# Patient Record
Sex: MALE | Race: WHITE | NOT HISPANIC OR LATINO | Employment: FULL TIME | URBAN - NONMETROPOLITAN AREA
[De-identification: names, ages, dates, MRNs, and addresses within clinical notes are randomized per-mention and may not be internally consistent; named-entity substitution may affect disease eponyms.]

---

## 2023-03-17 DIAGNOSIS — K21.9 GASTRO-ESOPHAGEAL REFLUX DISEASE WITHOUT ESOPHAGITIS: ICD-10-CM

## 2023-03-20 RX ORDER — PANTOPRAZOLE SODIUM 40 MG/1
TABLET, DELAYED RELEASE ORAL
Qty: 90 TABLET | Refills: 3 | Status: SHIPPED | OUTPATIENT
Start: 2023-03-20

## 2023-06-20 DIAGNOSIS — Z71.6 ENCOUNTER FOR SMOKING CESSATION COUNSELING: Primary | ICD-10-CM

## 2023-06-20 RX ORDER — IBUPROFEN 200 MG
1 TABLET ORAL EVERY 24 HOURS
Qty: 30 PATCH | Refills: 0 | Status: SHIPPED | OUTPATIENT
Start: 2023-06-20 | End: 2023-08-16

## 2023-08-15 DIAGNOSIS — Z71.6 ENCOUNTER FOR SMOKING CESSATION COUNSELING: ICD-10-CM

## 2023-08-16 RX ORDER — IBUPROFEN 200 MG
1 TABLET ORAL EVERY 24 HOURS
Qty: 28 PATCH | Refills: 1 | Status: SHIPPED | OUTPATIENT
Start: 2023-08-16 | End: 2023-12-21 | Stop reason: WASHOUT

## 2023-12-20 PROBLEM — R29.898 XIPHOID PROMINENCE: Status: ACTIVE | Noted: 2023-12-20

## 2023-12-20 PROBLEM — E78.5 HYPERLIPIDEMIA: Status: ACTIVE | Noted: 2023-12-20

## 2023-12-20 PROBLEM — E11.9 DIABETES MELLITUS, TYPE 2 (MULTI): Status: ACTIVE | Noted: 2023-12-20

## 2023-12-20 PROBLEM — M54.50 LOW BACK PAIN: Status: ACTIVE | Noted: 2023-12-20

## 2023-12-20 PROBLEM — F17.200 NICOTINE DEPENDENCE: Status: ACTIVE | Noted: 2023-12-20

## 2023-12-20 PROBLEM — K21.9 GERD (GASTROESOPHAGEAL REFLUX DISEASE): Status: ACTIVE | Noted: 2023-12-20

## 2023-12-20 PROBLEM — E11.21 DIABETIC NEPHROPATHY (MULTI): Status: ACTIVE | Noted: 2023-12-20

## 2023-12-20 PROBLEM — E78.1 HYPERTRIGLYCERIDEMIA: Status: ACTIVE | Noted: 2023-12-20

## 2023-12-20 PROBLEM — D72.829 LEUKOCYTOSIS: Status: ACTIVE | Noted: 2023-12-20

## 2023-12-20 PROBLEM — I10 HYPERTENSION: Status: ACTIVE | Noted: 2023-12-20

## 2023-12-20 RX ORDER — ACETAMINOPHEN 500 MG
1 TABLET ORAL DAILY
COMMUNITY
Start: 2014-11-26

## 2023-12-20 RX ORDER — LOSARTAN POTASSIUM 100 MG/1
100 TABLET ORAL DAILY
COMMUNITY
End: 2023-12-21 | Stop reason: ALTCHOICE

## 2023-12-20 RX ORDER — HYDROCHLOROTHIAZIDE 12.5 MG/1
12.5 CAPSULE ORAL DAILY
COMMUNITY
Start: 2023-05-30

## 2023-12-20 RX ORDER — CYCLOBENZAPRINE HCL 5 MG
1 TABLET ORAL NIGHTLY
COMMUNITY
Start: 2016-05-26 | End: 2023-12-21 | Stop reason: WASHOUT

## 2023-12-20 RX ORDER — INSULIN GLARGINE 100 [IU]/ML
30 INJECTION, SOLUTION SUBCUTANEOUS
COMMUNITY
Start: 2021-09-16

## 2023-12-20 RX ORDER — INSULIN LISPRO 100 [IU]/ML
25 INJECTION, SOLUTION INTRAVENOUS; SUBCUTANEOUS
COMMUNITY
Start: 2023-07-21 | End: 2023-12-21 | Stop reason: DRUGHIGH

## 2023-12-20 RX ORDER — METFORMIN HYDROCHLORIDE 500 MG/1
TABLET, EXTENDED RELEASE ORAL
COMMUNITY
Start: 2016-05-26 | End: 2023-12-21 | Stop reason: ALTCHOICE

## 2023-12-20 RX ORDER — PEN NEEDLE, DIABETIC 31 GX5/16"
NEEDLE, DISPOSABLE MISCELLANEOUS
COMMUNITY
Start: 2023-03-02

## 2023-12-20 RX ORDER — FENOFIBRATE 200 MG/1
CAPSULE ORAL
COMMUNITY
End: 2023-12-26

## 2023-12-21 ENCOUNTER — OFFICE VISIT (OUTPATIENT)
Dept: PRIMARY CARE | Facility: CLINIC | Age: 63
End: 2023-12-21
Payer: COMMERCIAL

## 2023-12-21 VITALS
HEIGHT: 69 IN | BODY MASS INDEX: 30.2 KG/M2 | OXYGEN SATURATION: 98 % | HEART RATE: 81 BPM | WEIGHT: 203.9 LBS | DIASTOLIC BLOOD PRESSURE: 76 MMHG | SYSTOLIC BLOOD PRESSURE: 160 MMHG | RESPIRATION RATE: 16 BRPM | TEMPERATURE: 97.8 F

## 2023-12-21 DIAGNOSIS — I10 PRIMARY HYPERTENSION: Primary | ICD-10-CM

## 2023-12-21 DIAGNOSIS — Z12.5 SCREENING PSA (PROSTATE SPECIFIC ANTIGEN): ICD-10-CM

## 2023-12-21 DIAGNOSIS — G89.29 CHRONIC BILATERAL LOW BACK PAIN, UNSPECIFIED WHETHER SCIATICA PRESENT: ICD-10-CM

## 2023-12-21 DIAGNOSIS — B35.6 TINEA CRURIS: ICD-10-CM

## 2023-12-21 DIAGNOSIS — M54.50 CHRONIC BILATERAL LOW BACK PAIN, UNSPECIFIED WHETHER SCIATICA PRESENT: ICD-10-CM

## 2023-12-21 DIAGNOSIS — E78.5 HYPERLIPIDEMIA, UNSPECIFIED HYPERLIPIDEMIA TYPE: ICD-10-CM

## 2023-12-21 PROBLEM — E66.9 OBESITY, CLASS I, BMI 30-34.9: Status: ACTIVE | Noted: 2023-07-21

## 2023-12-21 PROBLEM — E66.811 OBESITY, CLASS I, BMI 30-34.9: Status: ACTIVE | Noted: 2023-07-21

## 2023-12-21 PROBLEM — K57.90 DIVERTICULOSIS: Status: ACTIVE | Noted: 2023-01-25

## 2023-12-21 PROCEDURE — 99214 OFFICE O/P EST MOD 30 MIN: CPT | Performed by: FAMILY MEDICINE

## 2023-12-21 PROCEDURE — 3077F SYST BP >= 140 MM HG: CPT | Performed by: FAMILY MEDICINE

## 2023-12-21 PROCEDURE — 4010F ACE/ARB THERAPY RXD/TAKEN: CPT | Performed by: FAMILY MEDICINE

## 2023-12-21 PROCEDURE — 3078F DIAST BP <80 MM HG: CPT | Performed by: FAMILY MEDICINE

## 2023-12-21 RX ORDER — EZETIMIBE 10 MG/1
10 TABLET ORAL DAILY
COMMUNITY
Start: 2023-11-19

## 2023-12-21 RX ORDER — VALSARTAN 320 MG/1
320 TABLET ORAL DAILY
Qty: 90 TABLET | Refills: 3 | Status: SHIPPED | OUTPATIENT
Start: 2023-12-21 | End: 2024-01-19 | Stop reason: SINTOL

## 2023-12-21 RX ORDER — CYCLOBENZAPRINE HCL 5 MG
5 TABLET ORAL 3 TIMES DAILY PRN
Qty: 30 TABLET | Refills: 1 | Status: SHIPPED | OUTPATIENT
Start: 2023-12-21 | End: 2024-02-19

## 2023-12-21 RX ORDER — INSULIN LISPRO 100 [IU]/ML
INJECTION, SOLUTION INTRAVENOUS; SUBCUTANEOUS
Qty: 15 ML | Refills: 0 | Status: SHIPPED | OUTPATIENT
Start: 2023-12-21

## 2023-12-21 RX ORDER — LATANOPROST 50 UG/ML
SOLUTION/ DROPS OPHTHALMIC
COMMUNITY
Start: 2023-11-29

## 2023-12-21 RX ORDER — MOMETASONE FUROATE 1 MG/G
CREAM TOPICAL
Qty: 45 G | Refills: 0 | Status: SHIPPED | OUTPATIENT
Start: 2023-12-21 | End: 2024-02-19

## 2023-12-21 ASSESSMENT — PATIENT HEALTH QUESTIONNAIRE - PHQ9
1. LITTLE INTEREST OR PLEASURE IN DOING THINGS: NOT AT ALL
2. FEELING DOWN, DEPRESSED OR HOPELESS: NOT AT ALL
SUM OF ALL RESPONSES TO PHQ9 QUESTIONS 1 AND 2: 0

## 2023-12-21 ASSESSMENT — PAIN SCALES - GENERAL: PAINLEVEL: 0-NO PAIN

## 2023-12-21 NOTE — PROGRESS NOTES
"Subjective   Patient ID: Kenn Paul is a 63 y.o. male who presents for Hyperlipidemia, Hypertension, and Diabetes.    HPI   Vivek is seen today for a routine follow-up of his cholesterol, blood pressure, medication review.  His diabetes is managed by endocrinology, still remains somewhat poorly controlled.  A1c levels have been above 8.  Medication list reconciled, including insulin doses.  He had right shoulder surgery earlier this year, has recovered as well as could be expected, is doing great from a range of motion standpoint, continues to work on strengthening it.  Neuropathy is stable, not sufficiently bothersome to warrant oncologic treatment.  He just had right eye surgery, thus ophthalmology care is up-to-date.    He continues to smoke 1 ppd, had previously quit with Chantix, has had difficulty getting nicotine patches to adhere to his skin.  Blood pressures have been elevated the last couple of checks, he has not checked blood pressures outside of the office.  Review of Systems  The full, 10+ multi-organ review of systems, is within normal limits with the exception of what is noted above in HPI.  Objective   /76 (BP Location: Left arm, Patient Position: Sitting, BP Cuff Size: Adult)   Pulse 81   Temp 36.6 °C (97.8 °F) (Temporal)   Resp 16   Ht 1.753 m (5' 9\")   Wt 92.5 kg (203 lb 14.4 oz)   SpO2 98%   BMI 30.11 kg/m²     Physical Exam  Constitutional/General appearance: alert, oriented, well-appearing, in no distress  Head and face exam is normal  No scleral icterus or conjunctival erythema present  Hearing is grossly normal  Respiratory effort is normal, no dyspnea noted  Cortical function is normal  Mood, affect, are pleasant, appropriate, and interactive.  Insight is normal  Cardiac exam reveals a regular rate and rhythm,  murmur present.   Lungs are clear bilaterally.    No lower extremity edema present.    Assessment/Plan     Diabetes, still poorly controlled, needs to improve " lifestyle efforts, defer insulin management to endocrinology.    Hypertension, need better control, continue hydrochlorothiazide, discontinue losartan in favor of full dose valsartan.  Goal of 130/80 discussed.  Please update me with some blood pressures in a month or so.    Hyperlipidemia, continue current medications, labs ordered.  Continue other medications, follow-up in 6 months

## 2023-12-23 DIAGNOSIS — E78.5 HYPERLIPIDEMIA, UNSPECIFIED: ICD-10-CM

## 2023-12-26 RX ORDER — FENOFIBRATE 200 MG/1
CAPSULE ORAL
Qty: 90 CAPSULE | Refills: 3 | Status: SHIPPED | OUTPATIENT
Start: 2023-12-26

## 2024-01-19 ENCOUNTER — TELEPHONE (OUTPATIENT)
Dept: PRIMARY CARE | Facility: CLINIC | Age: 64
End: 2024-01-19
Payer: COMMERCIAL

## 2024-01-19 DIAGNOSIS — I10 PRIMARY HYPERTENSION: Primary | ICD-10-CM

## 2024-01-19 RX ORDER — VALSARTAN 160 MG/1
TABLET ORAL
Qty: 60 TABLET | Refills: 11 | Status: SHIPPED | OUTPATIENT
Start: 2024-01-19

## 2024-01-19 NOTE — TELEPHONE ENCOUNTER
Pt calling said that the Valsartan 320 mg he can not take they are getting stuck in his throat he has also tried cutting in half and still can not take. Please advise?

## 2024-02-17 DIAGNOSIS — B35.6 TINEA CRURIS: ICD-10-CM

## 2024-02-19 RX ORDER — MOMETASONE FUROATE 1 MG/G
CREAM TOPICAL
Qty: 45 G | Refills: 0 | Status: SHIPPED | OUTPATIENT
Start: 2024-02-19

## 2024-06-18 ASSESSMENT — ENCOUNTER SYMPTOMS
SHORTNESS OF BREATH: 0
PALPITATIONS: 0
HEADACHES: 0
NECK PAIN: 0
ORTHOPNEA: 0
BLURRED VISION: 0
SWEATS: 0
PND: 0
HYPERTENSION: 1

## 2024-06-21 ENCOUNTER — APPOINTMENT (OUTPATIENT)
Dept: PRIMARY CARE | Facility: CLINIC | Age: 64
End: 2024-06-21
Payer: COMMERCIAL

## 2024-06-21 VITALS
DIASTOLIC BLOOD PRESSURE: 69 MMHG | OXYGEN SATURATION: 95 % | SYSTOLIC BLOOD PRESSURE: 129 MMHG | BODY MASS INDEX: 29.12 KG/M2 | WEIGHT: 197.2 LBS | TEMPERATURE: 98 F | HEART RATE: 96 BPM

## 2024-06-21 DIAGNOSIS — G89.29 CHRONIC BILATERAL LOW BACK PAIN, UNSPECIFIED WHETHER SCIATICA PRESENT: Primary | ICD-10-CM

## 2024-06-21 DIAGNOSIS — E11.42 TYPE 2 DIABETES MELLITUS WITH DIABETIC POLYNEUROPATHY, WITH LONG-TERM CURRENT USE OF INSULIN (MULTI): ICD-10-CM

## 2024-06-21 DIAGNOSIS — E78.5 HYPERLIPIDEMIA, UNSPECIFIED HYPERLIPIDEMIA TYPE: ICD-10-CM

## 2024-06-21 DIAGNOSIS — Z79.4 TYPE 2 DIABETES MELLITUS WITH DIABETIC POLYNEUROPATHY, WITH LONG-TERM CURRENT USE OF INSULIN (MULTI): ICD-10-CM

## 2024-06-21 DIAGNOSIS — Z12.5 SCREENING PSA (PROSTATE SPECIFIC ANTIGEN): ICD-10-CM

## 2024-06-21 DIAGNOSIS — Z23 IMMUNIZATION DUE: ICD-10-CM

## 2024-06-21 DIAGNOSIS — M54.50 CHRONIC BILATERAL LOW BACK PAIN, UNSPECIFIED WHETHER SCIATICA PRESENT: Primary | ICD-10-CM

## 2024-06-21 DIAGNOSIS — I10 PRIMARY HYPERTENSION: ICD-10-CM

## 2024-06-21 PROCEDURE — 4010F ACE/ARB THERAPY RXD/TAKEN: CPT | Performed by: FAMILY MEDICINE

## 2024-06-21 PROCEDURE — 3078F DIAST BP <80 MM HG: CPT | Performed by: FAMILY MEDICINE

## 2024-06-21 PROCEDURE — 99214 OFFICE O/P EST MOD 30 MIN: CPT | Performed by: FAMILY MEDICINE

## 2024-06-21 PROCEDURE — 3074F SYST BP LT 130 MM HG: CPT | Performed by: FAMILY MEDICINE

## 2024-06-21 PROCEDURE — 90715 TDAP VACCINE 7 YRS/> IM: CPT | Performed by: FAMILY MEDICINE

## 2024-06-21 PROCEDURE — 90471 IMMUNIZATION ADMIN: CPT | Performed by: FAMILY MEDICINE

## 2024-06-21 RX ORDER — CYCLOBENZAPRINE HCL 5 MG
1 TABLET ORAL 3 TIMES DAILY PRN
COMMUNITY
Start: 2023-12-21 | End: 2024-06-21 | Stop reason: SDUPTHER

## 2024-06-21 RX ORDER — CYCLOBENZAPRINE HCL 5 MG
5 TABLET ORAL NIGHTLY
Qty: 40 TABLET | Refills: 0 | Status: SHIPPED | OUTPATIENT
Start: 2024-06-21

## 2024-06-21 RX ORDER — INSULIN GLARGINE 100 [IU]/ML
INJECTION, SOLUTION SUBCUTANEOUS
Qty: 3 ML | Refills: 0 | Status: SHIPPED | OUTPATIENT
Start: 2024-06-21

## 2024-06-21 RX ORDER — INSULIN LISPRO 100 [IU]/ML
INJECTION, SOLUTION INTRAVENOUS; SUBCUTANEOUS
Qty: 15 ML | Refills: 0 | Status: SHIPPED | OUTPATIENT
Start: 2024-06-21

## 2024-06-21 NOTE — PROGRESS NOTES
Subjective   Patient ID: Kenn Paul is a 63 y.o. male who presents for Follow-up (Pt is here to follow up on DM/HTN/Chol,. Pt states he has started having ringing in his ears since he started taking insulin. He states that some days are worse then others. He states it is in both ears. ).    HPI   Vivek was seen today for a routine follow-up of his medications, including those for hypertension, hyperlipidemia, diabetes.  The latter is managed by endocrinology, Dr. Shah.  He has an appointment in August with him,   Has not had a recent A1c.  Medication(s) are being taken and tolerated as prescribed, without concerns, list reconciled today.  There are no complaints of chest pain, shortness of breath, lower extremity edema, or exertional concerns  Vivek feels hypoglycemic below 100, occurs perhaps twice/month,   Usually associated with increased activity or more often skipping meals.  He only uses his Humalog with meals, generally twice daily.  Review of Systems  The full review of systems is negative with the exception of what is noted in HPI    Objective   /69 (BP Location: Right arm, Patient Position: Sitting, BP Cuff Size: Adult)   Pulse 96   Temp 36.7 °C (98 °F) (Temporal)   Wt 89.4 kg (197 lb 3.2 oz)   SpO2 95%   BMI 29.12 kg/m²     Physical Exam  Constitutional/General appearance: alert, oriented, well-appearing, in no distress  Head and face exam is normal  No scleral icterus or conjunctival erythema present  Hearing is grossly normal  Respiratory effort is normal, no dyspnea noted  Cortical function is normal  Mood, affect, are pleasant, appropriate, and interactive.  Insight is normal    Assessment/Plan     Hypertension--- since today's blood pressures are at goal, I have recommended continuing the current treatment regimen, including medication as noted above, as well as a low salt, low-fat, high-fiber diet.  Exercise is to be continued as able and tolerated.  We will continue to follow  the high blood pressure on an every six-month basis, and address additional needs should they arise.    Hyperlipidemia, not on statin therapy, remotely there was a question of whether or not it caused pancreatitis.    Continue diabetes management, per endocrinology.    Order for labs provided, including a PSA.  Boostrix updated    Follow-up in 6 months  **Portions of this medical record have been created using voice recognition software and may have minor errors which are inherent in voice recognition systems. It has not been fully edited for typographical or grammatical errors**

## 2024-08-05 LAB — HEMOGLOBIN A1C/HEMOGLOBIN TOTAL IN BLOOD EXTERNAL: 10 %

## 2024-08-20 DIAGNOSIS — B35.6 TINEA CRURIS: ICD-10-CM

## 2024-08-20 RX ORDER — MOMETASONE FUROATE 1 MG/G
CREAM TOPICAL
Qty: 45 G | Refills: 0 | Status: SHIPPED | OUTPATIENT
Start: 2024-08-20

## 2024-10-06 ENCOUNTER — TELEPHONE (OUTPATIENT)
Dept: PRIMARY CARE | Facility: CLINIC | Age: 64
End: 2024-10-06

## 2024-10-06 DIAGNOSIS — B35.6 TINEA CRURIS: ICD-10-CM

## 2024-10-07 NOTE — TELEPHONE ENCOUNTER
Received refill request for mometasone cream.  Please check to see if he is still using it in his groin.  If so, he really should not use it any longer, as it can thin the skin over time.  If symptoms persist, would need to try a nonsteroid cream

## 2024-10-10 RX ORDER — MOMETASONE FUROATE 1 MG/G
CREAM TOPICAL
Qty: 45 G | Refills: 0 | OUTPATIENT
Start: 2024-10-10

## 2024-10-10 NOTE — TELEPHONE ENCOUNTER
Patient states he does not need any more of this cream, he has several tubes at home and uses very sparingly

## 2025-01-06 ENCOUNTER — INPATIENT HOSPITAL (OUTPATIENT)
Dept: URBAN - METROPOLITAN AREA HOSPITAL 50 | Facility: HOSPITAL | Age: 65
End: 2025-01-06

## 2025-01-06 DIAGNOSIS — K59.09 OTHER CONSTIPATION: ICD-10-CM

## 2025-01-06 DIAGNOSIS — K86.1 OTHER CHRONIC PANCREATITIS: ICD-10-CM

## 2025-01-06 PROCEDURE — 99222 1ST HOSP IP/OBS MODERATE 55: CPT | Performed by: INTERNAL MEDICINE

## 2025-01-07 ENCOUNTER — INPATIENT HOSPITAL (OUTPATIENT)
Dept: URBAN - METROPOLITAN AREA HOSPITAL 50 | Facility: HOSPITAL | Age: 65
End: 2025-01-07

## 2025-01-07 DIAGNOSIS — K59.09 OTHER CONSTIPATION: ICD-10-CM

## 2025-01-07 DIAGNOSIS — K86.1 OTHER CHRONIC PANCREATITIS: ICD-10-CM

## 2025-01-07 PROCEDURE — 99233 SBSQ HOSP IP/OBS HIGH 50: CPT | Performed by: CLINICAL NURSE SPECIALIST

## 2025-01-10 ENCOUNTER — PATIENT OUTREACH (OUTPATIENT)
Dept: PRIMARY CARE | Facility: CLINIC | Age: 65
End: 2025-01-10
Payer: COMMERCIAL

## 2025-01-10 NOTE — PROGRESS NOTES
Discharge Facility: [facility]  Discharge Diagnosis: pancreatitis  Admission Date: 1/5/25  Discharge Date: 1/8/25    PCP Appointment Date: 1/24/25  Specialist Appointment Date: N/A  Hospital Encounter and Summary Linked: Yes  Two attempts were made to reach patient within two business days after discharge. Voicemail left with contact information for patient to call back with any non-emergent questions or concerns.

## 2025-01-12 DIAGNOSIS — E78.5 HYPERLIPIDEMIA, UNSPECIFIED: ICD-10-CM

## 2025-01-14 RX ORDER — FENOFIBRATE 200 MG/1
CAPSULE ORAL
Qty: 90 CAPSULE | Refills: 1 | Status: SHIPPED | OUTPATIENT
Start: 2025-01-14

## 2025-01-24 ENCOUNTER — APPOINTMENT (OUTPATIENT)
Dept: PRIMARY CARE | Facility: CLINIC | Age: 65
End: 2025-01-24
Payer: COMMERCIAL

## 2025-01-24 ENCOUNTER — PATIENT OUTREACH (OUTPATIENT)
Dept: PRIMARY CARE | Facility: CLINIC | Age: 65
End: 2025-01-24

## 2025-01-30 ENCOUNTER — OFFICE (OUTPATIENT)
Dept: URBAN - METROPOLITAN AREA CLINIC 26 | Facility: CLINIC | Age: 65
End: 2025-01-30
Payer: COMMERCIAL

## 2025-01-30 VITALS
HEIGHT: 69 IN | TEMPERATURE: 97.9 F | HEART RATE: 93 BPM | SYSTOLIC BLOOD PRESSURE: 154 MMHG | DIASTOLIC BLOOD PRESSURE: 89 MMHG | WEIGHT: 180 LBS | SYSTOLIC BLOOD PRESSURE: 136 MMHG

## 2025-01-30 DIAGNOSIS — R10.9 UNSPECIFIED ABDOMINAL PAIN: ICD-10-CM

## 2025-01-30 DIAGNOSIS — K85.90 ACUTE PANCREATITIS WITHOUT NECROSIS OR INFECTION, UNSPECIFIE: ICD-10-CM

## 2025-01-30 DIAGNOSIS — K59.00 CONSTIPATION, UNSPECIFIED: ICD-10-CM

## 2025-01-30 DIAGNOSIS — K22.70 BARRETT'S ESOPHAGUS WITHOUT DYSPLASIA: ICD-10-CM

## 2025-01-30 DIAGNOSIS — Z86.0100 PERSONAL HISTORY OF COLON POLYPS, UNSPECIFIED: ICD-10-CM

## 2025-01-30 DIAGNOSIS — K21.9 GASTRO-ESOPHAGEAL REFLUX DISEASE WITHOUT ESOPHAGITIS: ICD-10-CM

## 2025-01-30 PROCEDURE — 99214 OFFICE O/P EST MOD 30 MIN: CPT | Performed by: NURSE PRACTITIONER

## 2025-01-30 RX ORDER — PANCRELIPASE 120000; 24000; 76000 [USP'U]/1; [USP'U]/1; [USP'U]/1
CAPSULE, DELAYED RELEASE PELLETS ORAL
Qty: 240 | Refills: 2 | Status: ACTIVE
Start: 2025-01-30

## 2025-02-03 ENCOUNTER — DOCUMENTATION (OUTPATIENT)
Dept: GASTROENTEROLOGY | Facility: HOSPITAL | Age: 65
End: 2025-02-03
Payer: COMMERCIAL

## 2025-02-03 NOTE — PROGRESS NOTES
Dr. Ruben Stovall's office received a referral for this patient from [facility] requesting an EGD/EUS for recurrent pancreatitis and history of Bose's Esophagus. Dr. Juan Guzman reviewed the referral on 2/2/2024 and approved proceeding with scheduling for an EUS.    Cassi Arango was notified to call and schedule this patient. Contact Gisel Pham RN at [phone] for any questions.      Below is supporting clinical information form the referral sent by [facility]'s office and patient's medical records. Referral documents from the referring office were scanned under Media, dated 1/30/2025.     Requesting EGD/EUS      Notes: EGD/EUS - recurrent pancreatitis/ Hx BE     Labs 1/8/2025:  ·       Bilirubin, Total: 0.4  ·       Alkaline Phosphatase: 60  ·       ALT: 9  ·       AST: 14  Lipase 63 on 1/7/2025     Most recent EGD (2013) Brandin Valadez MD:  ·       Mild duodenitis  ·       Mild bile reflux gastritis  ·       Irregular GEJ  ·       Esophageal stricture s/p 60 Fr Olmedo dilation  ·       (+) BE on biopsies     Christofer Wiggins MD - 01/07/2025 12:19 PM EST  Formatting of this note is different from the original.  GASTROENTEROLOGY CONSULT PROGRESS NOTE     Patient Name: Kenn Paul  MRN: [id number]  SERVICE DATE: January 7, 2025  SERVICE TIME: 12:19 PM     ASSESSMENT  Recurrent, acute pancreatitis (4th episode)  Bose's esophagus / Hx colon polyps  Diabetes  Chronic aspirin use  Tobacco use     PLAN  - Trial low fat, dental soft diet  - Await results of IGG4  - Recommend EGD/EUS as outpatient in ~ 4 weeks  - Smoking cessation advised  - Protonix 40 mg daily  - Monitor CBC, CMP, lipase  - Also recommend colonoscopy once acute issues have resolved     Patient seen and examined. Discussed with mid level provider. Key findings confirmed. Plan as outlined.     Christofer Wiggins MD  January 7, 2025  1:08 PM     Liver Function, Amylase, & Lipase   Recent Labs   01/07/25  5692  01/06/25  0445 01/05/25  1308   TPROT 5.9* 6.0* 7.3   ALB 3.4* 3.6* 4.2   ALT 8* 9* 11   AST 12* 11* 14   ALKPHOS 59 56 73   TBILI 0.4 0.4 0.3   LIPASE 63* 66* 117*         1/5/25 CT A/P  Liver:   * There is a 7 mm low-density lesion in the dome of the liver axial   image 2/18. This appear to be present previously but not as well seen.   Therefore is felt to be benign   Homogeneous texture.   Biliary: No ductal dilatation is seen.   GI tract: No bowel dilatation is seen. No evidence of obstruction.   Spleen: Spleen is unremarkable.   Pancreas:   * There is minimal indistinctness surrounding the head of the pancreas   images 2/30-33   Adrenals: Adrenal glands are unremarkable.   Kidneys:   RIGHT kidney:   No masses   No calcifications are seen.   No hydronephrosis is seen.   LEFT kidney:   No masses   No calcifications are seen.   No hydronephrosis is seen.   Lymph nodes: No evidence of adenopathy.   Mesentery/Peritoneum: No ascites or mass.   Vasculature: No evidence of dilatation of the abdominal aorta.   Bony structures: No fractures or dislocations are seen.   Soft tissues: No acute findings.   CT PELVIS FINDINGS:   Lymph Nodes: No enlarged lymph nodes.   Urinary bladder: Unremarkable   IMPRESSION:  Mild indistinctness around the head of the pancreas. Could represent  mild pancreatitis      1/6/25 RUQ US  IMPRESSION:  Hepatic steatosis.  Nonvisualization of the pancreas  Normal gallbladder  CBD 3 mm

## 2025-02-04 ENCOUNTER — INPATIENT HOSPITAL (OUTPATIENT)
Dept: URBAN - METROPOLITAN AREA HOSPITAL 50 | Facility: HOSPITAL | Age: 65
End: 2025-02-04

## 2025-02-04 DIAGNOSIS — K86.1 OTHER CHRONIC PANCREATITIS: ICD-10-CM

## 2025-02-04 DIAGNOSIS — K31.89 OTHER DISEASES OF STOMACH AND DUODENUM: ICD-10-CM

## 2025-02-04 DIAGNOSIS — K44.9 DIAPHRAGMATIC HERNIA WITHOUT OBSTRUCTION OR GANGRENE: ICD-10-CM

## 2025-02-04 DIAGNOSIS — K22.70 BARRETT'S ESOPHAGUS WITHOUT DYSPLASIA: ICD-10-CM

## 2025-02-04 DIAGNOSIS — K59.09 OTHER CONSTIPATION: ICD-10-CM

## 2025-02-04 PROCEDURE — 99222 1ST HOSP IP/OBS MODERATE 55: CPT | Performed by: INTERNAL MEDICINE

## 2025-02-05 PROCEDURE — 43239 EGD BIOPSY SINGLE/MULTIPLE: CPT | Performed by: INTERNAL MEDICINE

## 2025-02-07 ENCOUNTER — INPATIENT HOSPITAL (OUTPATIENT)
Dept: URBAN - METROPOLITAN AREA HOSPITAL 50 | Facility: HOSPITAL | Age: 65
End: 2025-02-07

## 2025-02-07 DIAGNOSIS — K44.9 DIAPHRAGMATIC HERNIA WITHOUT OBSTRUCTION OR GANGRENE: ICD-10-CM

## 2025-02-07 DIAGNOSIS — K59.09 OTHER CONSTIPATION: ICD-10-CM

## 2025-02-07 DIAGNOSIS — K83.8 OTHER SPECIFIED DISEASES OF BILIARY TRACT: ICD-10-CM

## 2025-02-07 DIAGNOSIS — K86.1 OTHER CHRONIC PANCREATITIS: ICD-10-CM

## 2025-02-07 PROCEDURE — 99233 SBSQ HOSP IP/OBS HIGH 50: CPT | Performed by: NURSE PRACTITIONER

## 2025-02-19 ENCOUNTER — OFFICE (OUTPATIENT)
Dept: URBAN - METROPOLITAN AREA CLINIC 26 | Facility: CLINIC | Age: 65
End: 2025-02-19

## 2025-02-19 VITALS
TEMPERATURE: 97.1 F | SYSTOLIC BLOOD PRESSURE: 140 MMHG | WEIGHT: 180 LBS | DIASTOLIC BLOOD PRESSURE: 87 MMHG | HEART RATE: 92 BPM | HEIGHT: 69 IN

## 2025-02-19 DIAGNOSIS — K82.8 OTHER SPECIFIED DISEASES OF GALLBLADDER: ICD-10-CM

## 2025-02-19 DIAGNOSIS — K22.70 BARRETT'S ESOPHAGUS WITHOUT DYSPLASIA: ICD-10-CM

## 2025-02-19 DIAGNOSIS — K59.00 CONSTIPATION, UNSPECIFIED: ICD-10-CM

## 2025-02-19 DIAGNOSIS — K85.90 ACUTE PANCREATITIS WITHOUT NECROSIS OR INFECTION, UNSPECIFIE: ICD-10-CM

## 2025-02-19 PROCEDURE — 99213 OFFICE O/P EST LOW 20 MIN: CPT | Performed by: NURSE PRACTITIONER

## 2025-03-27 ENCOUNTER — ANESTHESIA (OUTPATIENT)
Dept: GASTROENTEROLOGY | Facility: HOSPITAL | Age: 65
End: 2025-03-27
Payer: COMMERCIAL

## 2025-03-27 ENCOUNTER — ANESTHESIA EVENT (OUTPATIENT)
Dept: GASTROENTEROLOGY | Facility: HOSPITAL | Age: 65
End: 2025-03-27
Payer: COMMERCIAL

## 2025-03-27 ENCOUNTER — HOSPITAL ENCOUNTER (OUTPATIENT)
Dept: GASTROENTEROLOGY | Facility: HOSPITAL | Age: 65
Discharge: HOME | End: 2025-03-27
Payer: COMMERCIAL

## 2025-03-27 VITALS
BODY MASS INDEX: 26.54 KG/M2 | RESPIRATION RATE: 14 BRPM | HEIGHT: 70 IN | HEART RATE: 81 BPM | OXYGEN SATURATION: 96 % | TEMPERATURE: 96.8 F | DIASTOLIC BLOOD PRESSURE: 79 MMHG | SYSTOLIC BLOOD PRESSURE: 125 MMHG | WEIGHT: 185.41 LBS

## 2025-03-27 DIAGNOSIS — Z87.19 HISTORY OF BARRETT ESOPHAGUS: ICD-10-CM

## 2025-03-27 DIAGNOSIS — K85.90 ACUTE RECURRENT PANCREATITIS (HHS-HCC): ICD-10-CM

## 2025-03-27 PROBLEM — N18.9 CHRONIC RENAL INSUFFICIENCY: Status: ACTIVE | Noted: 2025-03-27

## 2025-03-27 LAB
GLUCOSE BLD MANUAL STRIP-MCNC: 101 MG/DL (ref 74–99)
GLUCOSE BLD MANUAL STRIP-MCNC: 82 MG/DL (ref 74–99)

## 2025-03-27 PROCEDURE — 2500000004 HC RX 250 GENERAL PHARMACY W/ HCPCS (ALT 636 FOR OP/ED): Performed by: ANESTHESIOLOGIST ASSISTANT

## 2025-03-27 PROCEDURE — 43237 ENDOSCOPIC US EXAM ESOPH: CPT | Performed by: INTERNAL MEDICINE

## 2025-03-27 PROCEDURE — 7100000010 HC PHASE TWO TIME - EACH INCREMENTAL 1 MINUTE

## 2025-03-27 PROCEDURE — 7100000009 HC PHASE TWO TIME - INITIAL BASE CHARGE

## 2025-03-27 PROCEDURE — A43237 PR ESOPHAGOGASTRODUODENOSCOPY US SCOPE W/ADJ STRXRS: Performed by: ANESTHESIOLOGIST ASSISTANT

## 2025-03-27 PROCEDURE — A43237 PR ESOPHAGOGASTRODUODENOSCOPY US SCOPE W/ADJ STRXRS: Performed by: ANESTHESIOLOGY

## 2025-03-27 PROCEDURE — 3700000002 HC GENERAL ANESTHESIA TIME - EACH INCREMENTAL 1 MINUTE

## 2025-03-27 PROCEDURE — 3700000001 HC GENERAL ANESTHESIA TIME - INITIAL BASE CHARGE

## 2025-03-27 PROCEDURE — 82947 ASSAY GLUCOSE BLOOD QUANT: CPT

## 2025-03-27 RX ORDER — PROPOFOL 10 MG/ML
INJECTION, EMULSION INTRAVENOUS CONTINUOUS PRN
Status: DISCONTINUED | OUTPATIENT
Start: 2025-03-27 | End: 2025-03-27

## 2025-03-27 RX ORDER — FENTANYL CITRATE 50 UG/ML
INJECTION, SOLUTION INTRAMUSCULAR; INTRAVENOUS AS NEEDED
Status: DISCONTINUED | OUTPATIENT
Start: 2025-03-27 | End: 2025-03-27

## 2025-03-27 RX ORDER — MIDAZOLAM HYDROCHLORIDE 1 MG/ML
INJECTION INTRAMUSCULAR; INTRAVENOUS AS NEEDED
Status: DISCONTINUED | OUTPATIENT
Start: 2025-03-27 | End: 2025-03-27

## 2025-03-27 RX ADMIN — PROPOFOL 300 MCG/KG/MIN: 10 INJECTION, EMULSION INTRAVENOUS at 13:38

## 2025-03-27 RX ADMIN — FENTANYL CITRATE 50 MCG: 50 INJECTION, SOLUTION INTRAMUSCULAR; INTRAVENOUS at 13:45

## 2025-03-27 RX ADMIN — PROPOFOL 50 MG: 10 INJECTION, EMULSION INTRAVENOUS at 13:39

## 2025-03-27 RX ADMIN — MIDAZOLAM HYDROCHLORIDE 2 MG: 1 INJECTION, SOLUTION INTRAMUSCULAR; INTRAVENOUS at 13:27

## 2025-03-27 RX ADMIN — FENTANYL CITRATE 50 MCG: 50 INJECTION, SOLUTION INTRAMUSCULAR; INTRAVENOUS at 13:38

## 2025-03-27 ASSESSMENT — PAIN - FUNCTIONAL ASSESSMENT
PAIN_FUNCTIONAL_ASSESSMENT: 0-10
PAIN_FUNCTIONAL_ASSESSMENT: UNABLE TO SELF-REPORT

## 2025-03-27 ASSESSMENT — ENCOUNTER SYMPTOMS: CONSTITUTIONAL NEGATIVE: 1

## 2025-03-27 ASSESSMENT — PAIN SCALES - GENERAL
PAIN_LEVEL: 0
PAINLEVEL_OUTOF10: 0 - NO PAIN

## 2025-03-27 ASSESSMENT — COLUMBIA-SUICIDE SEVERITY RATING SCALE - C-SSRS
1. IN THE PAST MONTH, HAVE YOU WISHED YOU WERE DEAD OR WISHED YOU COULD GO TO SLEEP AND NOT WAKE UP?: NO
6. HAVE YOU EVER DONE ANYTHING, STARTED TO DO ANYTHING, OR PREPARED TO DO ANYTHING TO END YOUR LIFE?: NO
2. HAVE YOU ACTUALLY HAD ANY THOUGHTS OF KILLING YOURSELF?: NO

## 2025-03-27 NOTE — ANESTHESIA POSTPROCEDURE EVALUATION
Patient: Kenn Paul    Procedure Summary       Date: 03/27/25 Room / Location: [facility]    Anesthesia Start: 1324 Anesthesia Stop: 1400    Procedure: ENDOSCOPIC ULTRASOUND (UPPER) Diagnosis:       Acute recurrent pancreatitis ([facility])      History of Bose esophagus    Scheduled Providers: Ruben Stovall DO; Mello Kamara MD; Shyam Sagastume, RN; Kanika Vail RN Responsible Provider: Mello Kamara MD    Anesthesia Type: MAC ASA Status: 3            Anesthesia Type: MAC    Vitals Value Taken Time   /79 03/27/25 1442   Temp 36 °C (96.8 °F) 03/27/25 1356   Pulse 75 03/27/25 1441   Resp 14 03/27/25 1441   SpO2 96 % 03/27/25 1441   Vitals shown include unfiled device data.    Anesthesia Post Evaluation    Patient location during evaluation: bedside  Patient participation: complete - patient cannot participate  Level of consciousness: awake  Pain score: 0  Pain management: adequate  Airway patency: patent  Cardiovascular status: acceptable and hemodynamically stable  Respiratory status: acceptable and nonlabored ventilation  Hydration status: acceptable  Postoperative Nausea and Vomiting: none        No notable events documented.

## 2025-03-27 NOTE — H&P
History Of Present Illness  Kenn Paul is a 64 y.o. male presenting with recurrent pancreatitis referred for EUS by Dr. Valadez.  He had a recent cholecystectomy at [facility] and feels better overall.  He has a recent diagnosis of BE without dysplasia on EGD in February.     Past Medical History  Past Medical History:   Diagnosis Date    Abdominal distension (gaseous) 01/09/2020    Abdominal bloating    Diabetes mellitus (Multi) 2004    Lateral epicondylitis, unspecified elbow 05/01/2014    Lateral epicondylitis    Left lower quadrant pain 06/05/2019    Acute left lower quadrant pain    Other conditions influencing health status 03/07/2014    Uncontrolled diabetes mellitus    Other disorders of prepuce 01/27/2015    Excessive foreskin    Other disorders of prepuce 01/27/2015    Foreskin does not retract    Other insect allergy status 09/27/2019    Allergic reaction to insect bite    Pain in unspecified elbow 05/20/2014    Joint pain, elbow    Personal history of other diseases of the digestive system 01/11/2020    History of acute pancreatitis    Personal history of other diseases of the digestive system 01/09/2020    History of constipation    Personal history of other diseases of the digestive system 06/13/2014    History of pancreatitis    Personal history of other diseases of the musculoskeletal system and connective tissue 11/15/2016    History of muscle pain    Personal history of other diseases of the respiratory system 12/01/2016    History of acute sinusitis    Personal history of other diseases of the respiratory system 02/05/2015    History of acute bronchitis    Personal history of other diseases of the respiratory system 02/05/2015    History of acute sinusitis    Personal history of other infectious and parasitic diseases 11/26/2014    History of tinea cruris    Personal history of other specified conditions 01/09/2020    History of abdominal pain     Surgical History  Past Surgical History:  "  Procedure Laterality Date    COLONOSCOPY  02/17/2014    Complete Colonoscopy    ESOPHAGOGASTRODUODENOSCOPY  02/17/2014    Diagnostic Esophagogastroduodenoscopy    EYE SURGERY  11/1/23    HERNIA REPAIR  02/17/2014    Hernia Repair     Social History  He reports that he has been smoking cigarettes. He has a 15 pack-year smoking history. He has never used smokeless tobacco. He reports that he does not currently use alcohol. He reports that he does not use drugs.    Family History  Family History   Problem Relation Name Age of Onset    Alzheimer's disease Mother          Allergies  Allergies   Allergen Reactions    Pollen Extracts Other     Watery eyes sneezing    Meperidine Nausea/vomiting    Rosuvastatin Other     PANCREATITIS    Simvastatin Other     PANCREATITIS      Review of Systems   Constitutional: Negative.         Physical Exam  Constitutional:       Appearance: Normal appearance.   Cardiovascular:      Rate and Rhythm: Normal rate and regular rhythm.   Pulmonary:      Effort: Pulmonary effort is normal.      Breath sounds: Normal breath sounds.   Abdominal:      Palpations: Abdomen is soft.   Neurological:      Mental Status: He is alert.   Psychiatric:         Mood and Affect: Mood normal.         Behavior: Behavior normal.         Thought Content: Thought content normal.         Judgment: Judgment normal.          Last Recorded Vitals  Blood pressure 162/87, pulse 95, temperature 36.3 °C (97.3 °F), temperature source Temporal, resp. rate 22, height 1.778 m (5' 10\"), weight 84.1 kg (185 lb 6.5 oz), SpO2 97%.    Assessment/Plan   EUS as planned     Ruben Stovall, DO  "

## 2025-03-27 NOTE — ANESTHESIA PREPROCEDURE EVALUATION
Patient: Kenn Paul    Procedure Information       Date/Time: 03/27/25 1330    Scheduled providers: Ruben Stovall DO; Mello Kamara MD    Procedure: ENDOSCOPIC ULTRASOUND (UPPER)    Location: [facility]            Relevant Problems   Anesthesia (within normal limits)      Cardiac   (+) Hyperlipidemia   (+) Hypertension   (+) Hypertriglyceridemia      Pulmonary  Smoker 3/4ppd      Neuro (within normal limits)      GI  Barretts esoph   (+) GERD (gastroesophageal reflux disease)      /Renal   (+) Chronic renal insufficiency      Liver  Heavy EtOH use quit 15y ago   (+) Fatty liver   (+) Pancreatitis ([facility]) (Recurrent pancreatitis)      Endocrine   (+) Diabetes mellitus, type 2 (Multi)   (+) Diabetic nephropathy (Multi)      Hematology (within normal limits)       Clinical information reviewed:                   NPO Detail:  No data recorded     Physical Exam    Airway  Mallampati: II     Cardiovascular    Dental   (+) upper dentures, lower dentures     Pulmonary    Abdominal            Anesthesia Plan    History of general anesthesia?: yes  History of complications of general anesthesia?: no    ASA 3     MAC     intravenous induction   Anesthetic plan and risks discussed with patient.

## 2025-03-27 NOTE — DISCHARGE INSTRUCTIONS

## 2025-03-31 ENCOUNTER — TELEPHONE (OUTPATIENT)
Dept: PRIMARY CARE | Facility: CLINIC | Age: 65
End: 2025-03-31
Payer: COMMERCIAL

## 2025-03-31 NOTE — TELEPHONE ENCOUNTER
Patient original called in to do a hospital fu explained that we need it done with in 14 days. He said that he can't take off work right now needs to be out a  month.  I did explain again this will be for BP meds he said ok

## 2025-04-28 ENCOUNTER — OFFICE (OUTPATIENT)
Dept: URBAN - METROPOLITAN AREA CLINIC 26 | Facility: CLINIC | Age: 65
End: 2025-04-28
Payer: COMMERCIAL

## 2025-04-28 VITALS
WEIGHT: 193 LBS | DIASTOLIC BLOOD PRESSURE: 88 MMHG | HEIGHT: 69 IN | TEMPERATURE: 97.6 F | HEART RATE: 87 BPM | SYSTOLIC BLOOD PRESSURE: 173 MMHG

## 2025-04-28 DIAGNOSIS — K21.9 GASTRO-ESOPHAGEAL REFLUX DISEASE WITHOUT ESOPHAGITIS: ICD-10-CM

## 2025-04-28 DIAGNOSIS — Z87.19 PERSONAL HISTORY OF OTHER DISEASES OF THE DIGESTIVE SYSTEM: ICD-10-CM

## 2025-04-28 DIAGNOSIS — K82.8 OTHER SPECIFIED DISEASES OF GALLBLADDER: ICD-10-CM

## 2025-04-28 DIAGNOSIS — K85.90 ACUTE PANCREATITIS WITHOUT NECROSIS OR INFECTION, UNSPECIFIE: ICD-10-CM

## 2025-04-28 DIAGNOSIS — K59.00 CONSTIPATION, UNSPECIFIED: ICD-10-CM

## 2025-04-28 DIAGNOSIS — Z86.0100 PERSONAL HISTORY OF COLON POLYPS, UNSPECIFIED: ICD-10-CM

## 2025-04-28 PROCEDURE — 99213 OFFICE O/P EST LOW 20 MIN: CPT | Performed by: NURSE PRACTITIONER

## 2025-04-28 RX ORDER — SENNOSIDES 8.6 MG/1
TABLET, FILM COATED ORAL
Qty: 60 | Refills: 3 | Status: ACTIVE

## 2025-04-28 RX ORDER — PANTOPRAZOLE 40 MG/1
TABLET, DELAYED RELEASE ORAL
Qty: 90 | Refills: 3 | Status: ACTIVE

## 2025-04-29 PROBLEM — R29.898 XIPHOID PROMINENCE: Status: RESOLVED | Noted: 2023-12-20 | Resolved: 2025-04-29

## 2025-04-29 NOTE — PROGRESS NOTES
"Subjective   Patient ID: Kenn Paul is a 64 y.o. male who presents for Follow-up (Meds/Pt would like to discuss smoking cessation-possibly try Wellbutrin).    Scheduled for \"bp meds\" - patient on diovan 320 mg daily (takes 2 160 mg tabs), was also prescribed amlodipine (after he was taken off hydrochlorothiazide), but has not taken it recently - BP is up today, is out   Diabetes managed by antony  Had gallbladder out earlier this year  History of Present Illness  Kenn Paul is a 64 year old male with hypertension and diabetes who presents for blood pressure management and smoking cessation.    He has a history of pancreatitis, which required hospitalization in January and February. During this period, hydrochlorothiazide and low-dose aspirin were discontinued due to his potential to cause pancreatitis and stomach bleeding, respectively. He underwent cholecystectomy and has since been able to consume solid foods without experiencing digestive symptoms.    His diabetes management has been challenging due to dietary changes following dental extractions in October. He is currently consuming a high-carbohydrate diet, impacting his glycemic control. He is awaiting dental implants and needs to lower his hemoglobin A1c to below 8 for the procedure and needs to check this today to see where he is at with his Hgba1c.     His blood pressure management has been compromised by running out of amlodipine, which he has not taken recently. He continues to take valsartan. He does not monitor his blood pressure at home but notes it is usually stable. His BP is elevated today, but is off the amlodipine.     He smokes about a pack of cigarettes daily and is interested in quitting. He has previously tried varenicline, which affected his perception, and nicotine patches, which were somewhat effective during a hospital stay. He is considering bupropion for smoking cessation as this was recommended by GI when he saw " them. His wife also smokes and is attempting to quit using varenicline.    He experienced significant weight loss from 201 to 175 pounds due to his inability to eat solid foods prior to his gallbladder surgery. He has since stabilized and is able to maintain a normal diet. No current digestive issues or hospitalizations since the cholecystectomy.    Objective     /75 (BP Location: Right arm, Patient Position: Sitting, BP Cuff Size: Adult)   Pulse 81   Temp 36.8 °C (98.3 °F) (Temporal)   Resp 14   Wt 87.5 kg (193 lb)   SpO2 95%   BMI 27.69 kg/m²      Physical Exam     Results         Assessment & Plan  Hypertension  Poor control today due to running out of amlodipine. Elevated blood pressure likely due to absence of amlodipine and recent medication timing. Valsartan is being taken, but amlodipine is needed for optimal control.  - Refill amlodipine prescription. Continue Valsartan.     Diabetes mellitus  Management is challenging due to dietary changes following dental extractions. Current diet is high in carbohydrates, leading to elevated blood glucose levels. A1c needs to be checked to assess control, especially in preparation for dental implant surgery. A1c must be below 8 for surgery to proceed.  - Order hemoglobin A1c test.  -Look for lower carb, sugar options that are still able to be consumed with dental issues.     Tobacco use disorder  Desire to quit smoking. Previously tried Chantix but experienced adverse effects. Wellbutrin recommended as an alternative, with potential to add nicotine replacement if needed. Discussed Wellbutrin's mechanism and role in smoking cessation. He is motivated to quit and open to using Wellbutrin as a starting point. Discussed potential side effects of Wellbutrin, including jitteriness, and the need to take it in the morning.  - Prescribe Wellbutrin for smoking cessation.  - Advise to contact the office if additional support is needed, such as nicotine replacement  therapy.    Pancreatitis  Likely related to hydrochlorothiazide use, which has been discontinued. No current symptoms or flare-ups since hospitalization and cholecystectomy.    Cholecystectomy  Post-surgery status is well-managed with no complications. Able to tolerate solid foods without gastrointestinal issues.    Ashley Cole MD     This medical note was created with the assistance of artificial intelligence (AI) for documentation purposes. The content has been reviewed and confirmed by the healthcare provider for accuracy and completeness. Patient consented to the use of audio recording and use of AI during their visit.     Tobacco Counseling  The patient smokes cigarettes and desires to quit.  We created the following quit plan:  Prescribed the following medications: bupropion.

## 2025-05-05 ENCOUNTER — APPOINTMENT (OUTPATIENT)
Dept: PRIMARY CARE | Facility: CLINIC | Age: 65
End: 2025-05-05
Payer: COMMERCIAL

## 2025-05-05 VITALS
OXYGEN SATURATION: 95 % | SYSTOLIC BLOOD PRESSURE: 162 MMHG | RESPIRATION RATE: 14 BRPM | HEART RATE: 81 BPM | DIASTOLIC BLOOD PRESSURE: 75 MMHG | TEMPERATURE: 98.3 F | BODY MASS INDEX: 27.69 KG/M2 | WEIGHT: 193 LBS

## 2025-05-05 DIAGNOSIS — I10 PRIMARY HYPERTENSION: Primary | ICD-10-CM

## 2025-05-05 DIAGNOSIS — F17.210 CIGARETTE NICOTINE DEPENDENCE WITHOUT COMPLICATION: ICD-10-CM

## 2025-05-05 DIAGNOSIS — F17.200 TOBACCO DEPENDENCY: ICD-10-CM

## 2025-05-05 DIAGNOSIS — E11.9 TYPE 2 DIABETES MELLITUS WITHOUT COMPLICATION, UNSPECIFIED WHETHER LONG TERM INSULIN USE: ICD-10-CM

## 2025-05-05 DIAGNOSIS — M54.50 CHRONIC BILATERAL LOW BACK PAIN, UNSPECIFIED WHETHER SCIATICA PRESENT: ICD-10-CM

## 2025-05-05 DIAGNOSIS — Z79.4 TYPE 2 DIABETES MELLITUS WITHOUT COMPLICATION, WITH LONG-TERM CURRENT USE OF INSULIN: ICD-10-CM

## 2025-05-05 DIAGNOSIS — E11.9 TYPE 2 DIABETES MELLITUS WITHOUT COMPLICATION, WITH LONG-TERM CURRENT USE OF INSULIN: ICD-10-CM

## 2025-05-05 DIAGNOSIS — G89.29 CHRONIC BILATERAL LOW BACK PAIN, UNSPECIFIED WHETHER SCIATICA PRESENT: ICD-10-CM

## 2025-05-05 PROCEDURE — 99214 OFFICE O/P EST MOD 30 MIN: CPT | Performed by: FAMILY MEDICINE

## 2025-05-05 PROCEDURE — 3078F DIAST BP <80 MM HG: CPT | Performed by: FAMILY MEDICINE

## 2025-05-05 PROCEDURE — 4010F ACE/ARB THERAPY RXD/TAKEN: CPT | Performed by: FAMILY MEDICINE

## 2025-05-05 PROCEDURE — 99406 BEHAV CHNG SMOKING 3-10 MIN: CPT | Performed by: FAMILY MEDICINE

## 2025-05-05 PROCEDURE — 3077F SYST BP >= 140 MM HG: CPT | Performed by: FAMILY MEDICINE

## 2025-05-05 RX ORDER — BLOOD-GLUCOSE SENSOR
EACH MISCELLANEOUS
COMMUNITY
Start: 2024-08-05

## 2025-05-05 RX ORDER — BUPROPION HYDROCHLORIDE 150 MG/1
150 TABLET ORAL EVERY MORNING
Qty: 90 TABLET | Refills: 1 | Status: SHIPPED | OUTPATIENT
Start: 2025-05-05 | End: 2025-11-01

## 2025-05-05 RX ORDER — MULTIVITAMIN
1 TABLET ORAL
COMMUNITY

## 2025-05-05 RX ORDER — DAPAGLIFLOZIN 5 MG/1
TABLET, FILM COATED ORAL
COMMUNITY
End: 2025-05-06 | Stop reason: WASHOUT

## 2025-05-05 RX ORDER — CYCLOBENZAPRINE HCL 5 MG
5 TABLET ORAL NIGHTLY
Qty: 40 TABLET | Refills: 0 | Status: SHIPPED | OUTPATIENT
Start: 2025-05-05

## 2025-05-05 RX ORDER — AMLODIPINE BESYLATE 5 MG/1
5 TABLET ORAL
COMMUNITY
Start: 2025-02-08 | End: 2025-05-05 | Stop reason: SDUPTHER

## 2025-05-05 RX ORDER — AMLODIPINE BESYLATE 5 MG/1
5 TABLET ORAL
Qty: 90 TABLET | Refills: 1 | Status: SHIPPED | OUTPATIENT
Start: 2025-05-05

## 2025-05-05 NOTE — PATIENT INSTRUCTIONS
Quitting Smoking    Quitting smoking is the most important step you can take to improve your health. We're glad you have set a goal to improve your health.    Quit Smoking Resources    In addition to medications, use the STAR plan to help you successfully quit.   Stick with your quit date!   Tell friends, family, and coworkers your quit date. Request their understanding and support.  Anticipate and prepare for challenges. Some examples are withdrawal symptoms, being around others who smoke, and drinking alcohol.  Remove all tobacco products and paraphernalia from your environment. Make your home and vehicles smoke-free.    Free resources for additional support:  National tobacco quitline: 1-800-QUIT-NOW (1-309.687.2857).  SmokefreeTXT is a free text program to assist you in quitting. Visit https://www.smokefree.gov/smokefreetxt for more information.

## 2025-05-06 PROBLEM — D72.829 LEUKOCYTOSIS: Status: RESOLVED | Noted: 2023-12-20 | Resolved: 2025-05-06

## 2025-05-06 PROBLEM — K85.90 PANCREATITIS (HHS-HCC): Status: RESOLVED | Noted: 2025-03-27 | Resolved: 2025-05-06

## 2025-05-06 PROBLEM — M54.50 LOW BACK PAIN: Status: RESOLVED | Noted: 2023-12-20 | Resolved: 2025-05-06

## 2025-05-06 PROBLEM — E66.811 OBESITY, CLASS I, BMI 30-34.9: Status: RESOLVED | Noted: 2023-07-21 | Resolved: 2025-05-06

## 2025-05-07 ENCOUNTER — TELEPHONE (OUTPATIENT)
Dept: PRIMARY CARE | Facility: CLINIC | Age: 65
End: 2025-05-07
Payer: COMMERCIAL

## 2025-05-07 NOTE — TELEPHONE ENCOUNTER
Patient was seen 5/5/25 by Kelsey and he said that the nurse came in and checked his A1C in the room. He is wanting to know the results.     Spoke with Donna which stated that the test was inconclusive due to clotting. There was an order place for a blood draw.       The patient was informed and will come in at his earliest convenience.

## 2025-05-20 LAB
EST. AVERAGE GLUCOSE BLD GHB EST-MCNC: 192 MG/DL
EST. AVERAGE GLUCOSE BLD GHB EST-SCNC: 10.6 MMOL/L
HBA1C MFR BLD: 8.3 %